# Patient Record
Sex: FEMALE | Race: WHITE | NOT HISPANIC OR LATINO | Employment: UNEMPLOYED | URBAN - METROPOLITAN AREA
[De-identification: names, ages, dates, MRNs, and addresses within clinical notes are randomized per-mention and may not be internally consistent; named-entity substitution may affect disease eponyms.]

---

## 2018-05-23 ENCOUNTER — APPOINTMENT (OUTPATIENT)
Dept: GENERAL RADIOLOGY | Facility: CLINIC | Age: 43
End: 2018-05-23
Attending: FAMILY MEDICINE

## 2018-05-23 ENCOUNTER — HOSPITAL ENCOUNTER (EMERGENCY)
Facility: CLINIC | Age: 43
Discharge: HOME | End: 2018-05-23
Attending: FAMILY MEDICINE | Admitting: FAMILY MEDICINE

## 2018-05-23 VITALS
RESPIRATION RATE: 16 BRPM | SYSTOLIC BLOOD PRESSURE: 137 MMHG | TEMPERATURE: 98.3 F | OXYGEN SATURATION: 100 % | DIASTOLIC BLOOD PRESSURE: 99 MMHG

## 2018-05-23 DIAGNOSIS — S89.92XA KNEE INJURY, LEFT, INITIAL ENCOUNTER: ICD-10-CM

## 2018-05-23 PROCEDURE — 99284 EMERGENCY DEPT VISIT MOD MDM: CPT | Performed by: FAMILY MEDICINE

## 2018-05-23 PROCEDURE — 99284 EMERGENCY DEPT VISIT MOD MDM: CPT | Mod: Z6 | Performed by: FAMILY MEDICINE

## 2018-05-23 PROCEDURE — 25000132 ZZH RX MED GY IP 250 OP 250 PS 637: Performed by: FAMILY MEDICINE

## 2018-05-23 PROCEDURE — 73560 X-RAY EXAM OF KNEE 1 OR 2: CPT | Mod: RT

## 2018-05-23 RX ORDER — HYDROCODONE BITARTRATE AND ACETAMINOPHEN 5; 325 MG/1; MG/1
1-2 TABLET ORAL EVERY 6 HOURS PRN
Qty: 12 TABLET | Refills: 0 | Status: SHIPPED | OUTPATIENT
Start: 2018-05-23

## 2018-05-23 RX ADMIN — IBUPROFEN 600 MG: 400 TABLET ORAL at 07:57

## 2018-05-23 NOTE — ED AVS SNAPSHOT
[facility] Emergency Department    [address] [address]    Phone:  [phone]    Fax:  [phone]                                       Debbie Hernandez   MRN: [id number]    Department:  [facility] Emergency Department   Date of Visit:  5/23/2018           Patient Information     Date Of Birth          1975        Your diagnoses for this visit were:     Knee injury, left, initial encounter        You were seen by Niall Conrad MD.      Follow-up Information     Follow up with [facility].    Contact information:    [address] [phone]        Discharge Instructions       Return to the Emergency Room if the following occurs:     Worsened pain, fever >101, or for any concern at anytime.    Or, follow-up with the following provider as we discussed:     Return to the orthopedic clinic here at [facility].  See contact information provided for details.  Call them today and tell them that you are seen in the ER and that you need close follow up in the clinic for a knee injury.    Medications discussed:    Ibuprofen 600 mg every six hours for pain (7 days duration).  Tylenol 1000 mg every six hours for pain (7 days duration).  Therefore, you can alternate these every three hours and do it safely.  Norco (5/325) 1-2 tablets every 8 hours for pain (start with one).  Note, each tab has 325 mg tylenol.  Do not exceed 3000 mg tylenol per 24 hours.  Crutches for pain, as needed.    If you received pain-relieving or sedating medication during your time in the ER, avoid alcohol, driving automobiles, or working with machinery.  Also, a responsible adult must stay with you.        Call the Nurse Advice Line at [phone] or [phone] for any concern at anytime.      24 Hour Appointment Hotline       To make an appointment at any [facility] clinic, call [phone] ([phone]). If you don't have a  family doctor or clinic, we will help you find one. [facility] clinics are conveniently located to serve the needs of you and your family.          ED Discharge Orders     Alum Crutches: Adult       Use gait belt during crutch training.                     Review of your medicines      START taking        Dose / Directions Last dose taken    HYDROcodone-acetaminophen 5-325 MG per tablet   Commonly known as:  NORCO   Dose:  1-2 tablet   Quantity:  12 tablet        Take 1-2 tablets by mouth every 6 hours as needed for pain maximum 10 tablet(s) per day   Refills:  0          Our records show that you are taking the medicines listed below. If these are incorrect, please call your family doctor or clinic.        Dose / Directions Last dose taken    ADVIL 200 MG capsule   Generic drug:  ibuprofen        1 CAPSULE EVERY 4 TO 6 HOURS AS NEEDED   Refills:  0        NAPROXEN PO   Dose:  1 tablet        Take 1 tablet by mouth 2 times daily (with meals).   Refills:  0        * oxyCODONE-acetaminophen 5-325 MG per tablet   Commonly known as:  PERCOCET   Dose:  1-2 tablet   Quantity:  20 tablet        Take 1-2 tablets by mouth every 6 hours as needed for pain.   Refills:  0        * oxyCODONE-acetaminophen 5-325 MG per tablet   Commonly known as:  PERCOCET   Dose:  1 tablet   Quantity:  20 tablet        Take 1 tablet by mouth every 6 hours as needed for pain.   Refills:  0        XANAX PO   Dose:  0.25 mg        Take 0.25 mg by mouth 3 times daily as needed.   Refills:  0        * Notice:  This list has 2 medication(s) that are the same as other medications prescribed for you. Read the directions carefully, and ask your doctor or other care provider to review them with you.            Information about OPIOIDS     PRESCRIPTION OPIOIDS: WHAT YOU NEED TO KNOW   You have a prescription for an opioid (narcotic) pain medicine. Opioids can cause addiction. If you have a history of chemical dependency of any type, you are at a higher risk  of becoming addicted to opioids. Only take this medicine after all other options have been tried. Take it for as short a time and as few doses as possible.     Do not:    Drive. If you drive while taking these medicines, you could be arrested for driving under the influence (DUI).    Operate heavy machinery    Do any other dangerous activities while taking these medicines.     Drink any alcohol while taking these medicines.      Take with any other medicines that contain acetaminophen. Read all labels carefully. Look for the word  acetaminophen  or  Tylenol.  Ask your pharmacist if you have questions or are unsure.    Store your pills in a secure place, locked if possible. We will not replace any lost or stolen medicine. If you don t finish your medicine, please throw away (dispose) as directed by your pharmacist. The [place] [employer]ution [employer] has more information about safe disposal: https://www.pca.[facility].[place].us/living-green/managing-unwanted-medications    All opioids tend to cause constipation. Drink plenty of water and eat foods that have a lot of fiber, such as fruits, vegetables, prune juice, apple juice and high-fiber cereal. Take a laxative (Miralax, milk of magnesia, Colace, Senna) if you don t move your bowels at least every other day.         Prescriptions were sent or printed at these locations (1 Prescription)                   Other Prescriptions                Printed at Department/Unit printer (1 of 1)         HYDROcodone-acetaminophen (NORCO) 5-325 MG per tablet                Procedures and tests performed during your visit     XR Knee Right 1/2 Views      Orders Needing Specimen Collection     None      Pending Results     No orders found from 5/21/2018 to 5/24/2018.            Pending Culture Results     No orders found from 5/21/2018 to 5/24/2018.            Pending Results Instructions     If you had any lab results that were not finalized at the time of your Discharge, you can call the  "ED Lab Result RN at [phone]. You will be contacted by this team for any positive Lab results or changes in treatment. The nurses are available 7 days a week from 10A to 6:30P.  You can leave a message 24 hours per day and they will return your call.        Test Results From Your Hospital Stay        2018  8:28 AM      Narrative     XR KNEE RT 1 /2 VW 2018 8:15 AM    HISTORY: Pain.    COMPARISON: None.        Impression     IMPRESSION: No evidence of acute fracture or malalignment. Mild  degenerative change with small lateral knee osteophytes.    KIMBERLY BARNES MD                Thank you for choosing [facility]       Thank you for choosing [facility] for your care. Our goal is always to provide you with excellent care. Hearing back from our patients is one way we can continue to improve our services. Please take a few minutes to complete the written survey that you may receive in the mail after you visit with us. Thank you!        "[employer]hart Information     [employer] lets you send messages to your doctor, view your test results, renew your prescriptions, schedule appointments and more. To sign up, go to [email or web address] . Click on \"Log in\" on the left side of the screen, which will take you to the Welcome page. Then click on \"Sign up Now\" on the right side of the page.     You will be asked to enter the access code listed below, as well as some personal information. Please follow the directions to create your username and password.     Your access code is: [email or web address]  Expires: 2018  8:56 AM     Your access code will  in 90 days. If you need help or a new code, please call your [facility] clinic or [phone].        Care EveryWhere ID     This is your Care EveryWhere ID. This could be used by other organizations to access your [facility] medical records  [id number]        Equal Access to Services     AIDA SINGH AH: Merari Stone, odilia longoria, emanuel burton, " gabe carlos'aan ah. So [facility] [phone].    ATENCIÓN: Si habla español, tiene a patton disposición servicios gratuitos de asistencia lingüística. Llame al [phone].    We comply with applicable federal civil rights laws and [place] laws. We do not discriminate on the basis of race, color, national origin, age, disability, sex, sexual orientation, or gender identity.            After Visit Summary       This is your record. Keep this with you and show to your community pharmacist(s) and doctor(s) at your next visit.

## 2018-05-23 NOTE — ED TRIAGE NOTES
Pt twisted L knee today, tripped on a stump and fell.  Denies injury elsewhere.  States he L knee shifted in, having lots of pain in knee.  Unable to straighten L leg.  Feeling tingling in foot with injury, denies any numbness or tingling now.  Constant pain in knee.  Unable to bear weight.  Pain behind knee and to outer side of knee.

## 2018-05-23 NOTE — ED AVS SNAPSHOT
[facility] Emergency Department    [address] [address]    Phone:  [phone]    Fax:  [phone]                                       Debbie Hernandez   MRN: [id number]    Department:  [facility] Emergency Department   Date of Visit:  5/23/2018           After Visit Summary Signature Page     I have received my discharge instructions, and my questions have been answered. I have discussed any challenges I see with this plan with the nurse or doctor.    ..........................................................................................................................................  Patient/Patient Representative Signature      ..........................................................................................................................................  Patient Representative Print Name and Relationship to Patient    ..................................................               ................................................  Date                                            Time    ..........................................................................................................................................  Reviewed by Signature/Title    ...................................................              ..............................................  Date                                                            Time

## 2018-05-23 NOTE — ED PROVIDER NOTES
HPI  Patient is a 42-year-old female presenting with a left knee injury.  She has a known history of meniscus injury and subsequent surgery involving both the left and right knees.  She had a patella fracture and subsequent chondromalacia on the right side as well.  Other past medical history reviewed.  Occasions reviewed.  She smokes.  Occasional alcohol, none recently.  No drugs of abuse.    The patient was walking outside this morning when she merely tripped over a stump.  As she recognized the stump she tried to hop or jump into a better position.  She came down onto the left foot as she did so.  She felt the knee twist and buckle.  She did not fall to the ground.  She immediately had sharp pain felt in the lateral and posterior knee.  This continued to worsen in severity and she has not been able to bear any weight fully on the left foot since the injury.  Her pain is located in the knee only.  No other injury reported.  She has difficulty extending and flexing the knee because of severe pain.    ROS: All other review of systems are negative other than that noted above.      Past Medical History:   Diagnosis Date     Cervical ca (H)      Knee pain      Past Surgical History:   Procedure Laterality Date     ARTHROSCOPY KNEE Right      D & C  6/2001     SURGICAL HISTORY OF -       [facility]     Family History   Problem Relation Age of Onset     DIABETES Mother      Hypertension Mother      DIABETES Father      Hypertension Father      HEART DISEASE Father      heart disease     Social History   Substance Use Topics     Smoking status: Current Every Day Smoker     Packs/day: 1.00     Years: 10.00     Types: Cigarettes     Smokeless tobacco: Never Used     Alcohol use Yes      Comment: rarely         PHYSICAL  BP (!) 137/99  Temp 98.3  F (36.8  C) (Oral)  Resp 16  SpO2 100%  General: Patient is alert and in moderate to severe distress.  Neurological: Alert.  Moving upper and lower extremities equally, bilaterally.   Head / Neck: Atraumatic.  Ears: Not done.  Eyes: Pupils are equal, round, and reactive.  Normal conjunctiva.  Nose: Midline.  No epistaxis.  Mouth / Throat:  Moist. Respiratory: No respiratory distress.  Cardiovascular: Regular rhythm.  Peripheral extremities are warm.  Abdomen / Pelvis: Not done.  Genitalia: Not done.  Musculoskeletal: The patient has tenderness along the posterior and lateral left knee.  I do not see an obvious effusion.  She is holding the knee in slight flexion, on a pillow.  She refuses to flex or extend the knee any further.  No hematoma appreciated.  No deformity appreciated.  She is not obviously tender over any bony prominences.  Skin: No evidence of rash or trauma.        PHYSICIAN  0754.  The patient has an injury to her left knee.  X-ray pending.  Ibuprofen 600 mg given.    IMAGING  Images reviewed by me.  Radiology report also reviewed.  XR Knee Right 1/2 Views   Final Result   IMPRESSION: No evidence of acute fracture or malalignment. Mild   degenerative change with small lateral knee osteophytes.      KIMBERLY BARNES MD        0858.  X-ray is unremarkable.  No evidence for acute fracture.  Severity of injury is not known at this time.  Crutches will be provided.  The patient refuses a knee immobilizer.  A prescription for hydrocodone/acetaminophen given, #12 tablets.  Follow up with orthopedics.  Contact information provided.      IMPRESSION    ICD-10-CM    1. Knee injury, left, initial encounter S89.92XA Alum Crutches: Adult            Niall Conrad MD  05/23/18 9353

## 2019-02-19 ENCOUNTER — HOSPITAL ENCOUNTER (EMERGENCY)
Facility: CLINIC | Age: 44
Discharge: HOME | End: 2019-02-19
Attending: EMERGENCY MEDICINE | Admitting: EMERGENCY MEDICINE
Payer: COMMERCIAL

## 2019-02-19 VITALS
HEART RATE: 93 BPM | BODY MASS INDEX: 22.18 KG/M2 | TEMPERATURE: 97.7 F | RESPIRATION RATE: 16 BRPM | HEIGHT: 66 IN | OXYGEN SATURATION: 100 % | WEIGHT: 138 LBS

## 2019-02-19 DIAGNOSIS — N30.01 ACUTE CYSTITIS WITH HEMATURIA: ICD-10-CM

## 2019-02-19 LAB
ALBUMIN UR-MCNC: 100 MG/DL
APPEARANCE UR: ABNORMAL
BILIRUB UR QL STRIP: NEGATIVE
COLOR UR AUTO: ABNORMAL
GLUCOSE UR STRIP-MCNC: NEGATIVE MG/DL
HCG UR QL: NEGATIVE
HGB UR QL STRIP: ABNORMAL
KETONES UR STRIP-MCNC: 5 MG/DL
LEUKOCYTE ESTERASE UR QL STRIP: ABNORMAL
MUCOUS THREADS #/AREA URNS LPF: PRESENT /LPF
NITRATE UR QL: NEGATIVE
PH UR STRIP: 5 PH (ref 5–7)
RBC #/AREA URNS AUTO: >182 /HPF (ref 0–2)
SOURCE: ABNORMAL
SP GR UR STRIP: 1.03 (ref 1–1.03)
UROBILINOGEN UR STRIP-MCNC: 2 MG/DL (ref 0–2)
WBC #/AREA URNS AUTO: >182 /HPF (ref 0–5)
WBC CLUMPS #/AREA URNS HPF: PRESENT /HPF

## 2019-02-19 PROCEDURE — 87086 URINE CULTURE/COLONY COUNT: CPT | Performed by: EMERGENCY MEDICINE

## 2019-02-19 PROCEDURE — 99283 EMERGENCY DEPT VISIT LOW MDM: CPT | Performed by: EMERGENCY MEDICINE

## 2019-02-19 PROCEDURE — 99284 EMERGENCY DEPT VISIT MOD MDM: CPT | Mod: Z6 | Performed by: EMERGENCY MEDICINE

## 2019-02-19 PROCEDURE — 81025 URINE PREGNANCY TEST: CPT | Performed by: EMERGENCY MEDICINE

## 2019-02-19 PROCEDURE — 81001 URINALYSIS AUTO W/SCOPE: CPT | Performed by: EMERGENCY MEDICINE

## 2019-02-19 RX ORDER — CIPROFLOXACIN 500 MG/1
500 TABLET, FILM COATED ORAL 2 TIMES DAILY
Qty: 14 TABLET | Refills: 0 | Status: SHIPPED | OUTPATIENT
Start: 2019-02-19 | End: 2019-02-26

## 2019-02-19 RX ORDER — FLUCONAZOLE 150 MG/1
TABLET ORAL
Qty: 2 TABLET | Refills: 0 | Status: SHIPPED | OUTPATIENT
Start: 2019-02-19 | End: 2019-02-22

## 2019-02-19 RX ORDER — PHENAZOPYRIDINE HYDROCHLORIDE 200 MG/1
200 TABLET, FILM COATED ORAL 3 TIMES DAILY
Qty: 9 TABLET | Refills: 0 | Status: SHIPPED | OUTPATIENT
Start: 2019-02-19 | End: 2019-02-22

## 2019-02-19 ASSESSMENT — MIFFLIN-ST. JEOR: SCORE: 1292.71

## 2019-02-19 NOTE — ED PROVIDER NOTES
History     Chief Complaint   Patient presents with     Rule out Urinary Tract Infection     4 days now, burning and pain with urination     HPI  Debbie Hernandez is a 43 year old female with a history of previous urinary tract infections who presents emergency department complaining of frequency and burning.  Patient states she gets about 2-3 urinary tract infections a year and states she is having similar symptoms today.  Began as some mild pain after urination but now has become frequency and worsening burning.  She states this morning when she urinated her urine was noted to have  some blood in it.  Patient states she has some very mild aching in her flank regions but denies significant pain.  She denies any chest pain or shortness of breath.  She has not had any fever.  She denies any focal numbness weakness any extremity.  Patient has not had any nausea vomiting.  Currently rates her pain a 3 out of 10.    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     Priority: Medium        Past Medical History:    Past Medical History:   Diagnosis Date     Cervical ca (H)      Knee pain        Past Surgical History:    Past Surgical History:   Procedure Laterality Date     ARTHROSCOPY KNEE Right      D & C  6/2001     SURGICAL HISTORY OF -       [place]       Family History:    Family History   Problem Relation Age of Onset     Diabetes Mother      Hypertension Mother      Diabetes Father      Hypertension Father      Heart Disease Father         heart disease       Social History:  Marital Status:  Single [1]  Social History     Tobacco Use     Smoking status: Current Every Day Smoker     Packs/day: 1.00     Years: 10.00     Pack years: 10.00     Types: Cigarettes     Smokeless tobacco: Never Used   Substance Use Topics     Alcohol use: Yes     Comment: rarely     Drug use:  "No        Medications:      ADVIL 200 MG OR CAPS   ALPRAZolam (XANAX PO)   HYDROcodone-acetaminophen (NORCO) 5-325 MG per tablet   NAPROXEN PO   oxyCODONE-acetaminophen (PERCOCET) 5-325 MG per tablet   oxyCODONE-acetaminophen (PERCOCET) 5-325 MG per tablet         Review of Systems  All systems reviewed and other than pertinent positives and negatives in HPI all other systems are negative.  Physical Exam   Pulse: 93  Temp: 97.7  F (36.5  C)  Resp: 16  Height: 167.6 cm (5' 6\")  Weight: 62.6 kg (138 lb)  SpO2: 100 %      Physical Exam   Constitutional: She appears well-developed and well-nourished. No distress.   HENT:   Head: Normocephalic.   Eyes: Conjunctivae are normal.   Pulmonary/Chest: Effort normal.   Abdominal: Soft. She exhibits no distension.   Mild suprapubic tenderness.   Musculoskeletal: Normal range of motion.   No flank pain present.   Neurological: She is alert. She exhibits normal muscle tone.   Skin: Skin is warm and dry. No rash noted.   Psychiatric: She has a normal mood and affect.   Nursing note and vitals reviewed.      ED Course        Procedures               Critical Care time:  none                 Labs Ordered and Resulted from Time of ED Arrival Up to the Time of Departure from the ED   ROUTINE UA WITH MICROSCOPIC - Abnormal; Notable for the following components:       Result Value    Ketones Urine 5 (*)     Blood Urine Large (*)     Protein Albumin Urine 100 (*)     Leukocyte Esterase Urine Trace (*)     WBC Urine >182 (*)     RBC Urine >182 (*)     WBC Clumps Present (*)     Mucous Urine Present (*)     All other components within normal limits   HCG QUALITATIVE URINE     Medications - No data to display    Assessments & Plan (with Medical Decision Making) records were reviewed.  Urinalysis was obtained.  Urine analysis was consistent with a UTI.  There is large RBCs and blood.  I discussed possible CT scan to rule out stone but patient feels this is her typical UTI.  She does not feel " further workup is warranted.  She would like to try antibiotics and if symptoms worsen she will return for imaging studies and further workup.  Patient will be covered with Cipro.  She will also be given Pyridium.  She understands to return if fevers, flank pain, decreased urine output increased abdominal pain or other symptoms present.  Patient has a history of getting candidal infections with antibiotics and therefore I did send her home with a prescription for Diflucan as needed.     I have reviewed the nursing notes.    I have reviewed the findings, diagnosis, plan and need for follow up with the patient.          Medication List      Started    ciprofloxacin 500 MG tablet  Commonly known as:  CIPRO  500 mg, Oral, 2 TIMES DAILY     fluconazole 150 MG tablet  Commonly known as:  DIFLUCAN  Take one tablet now, and one tablet in three days     phenazopyridine 200 MG tablet  Commonly known as:  PYRIDIUM  200 mg, Oral, 3 TIMES DAILY            Final diagnoses:   Acute cystitis with hematuria       2/19/2019   [facility] EMERGENCY DEPARTMENT     Ludin Lerma MD  02/20/19 1936

## 2019-02-19 NOTE — ED AVS SNAPSHOT
[facility] Emergency Department  [address] [address]  Phone:  [phone]  Fax:  [phone]                                    Debbie Hernandez   MRN: [id number]    Department:  [facility] Emergency Department   Date of Visit:  2/19/2019           After Visit Summary Signature Page    I have received my discharge instructions, and my questions have been answered. I have discussed any challenges I see with this plan with the nurse or doctor.    ..........................................................................................................................................  Patient/Patient Representative Signature      ..........................................................................................................................................  Patient Representative Print Name and Relationship to Patient    ..................................................               ................................................  Date                                   Time    ..........................................................................................................................................  Reviewed by Signature/Title    ...................................................              ..............................................  Date                                               Time          22EPIC Rev 08/18

## 2019-02-19 NOTE — DISCHARGE INSTRUCTIONS
Return if symptoms worsen or new symptoms develop.  Follow-up with primary care physician later this week for recheck take antibiotics as directed take Pyridium as directed this may turn your urine orange.  If blood in your urine does not improve or other symptoms present please return for further evaluation and care.  Drink plenty of fluids.  We discussed possible CT scan for stone but at this time we will treat for urinary tract infection if persistent flank pain please return for recheck.

## 2019-02-20 LAB
BACTERIA SPEC CULT: NORMAL
SPECIMEN SOURCE: NORMAL

## 2019-12-29 ENCOUNTER — HOSPITAL ENCOUNTER (EMERGENCY)
Facility: CLINIC | Age: 44
Discharge: LEFT BEFORE DISCHARGE | End: 2019-12-29
Attending: EMERGENCY MEDICINE
Payer: COMMERCIAL

## 2019-12-29 VITALS
WEIGHT: 129 LBS | BODY MASS INDEX: 20.73 KG/M2 | DIASTOLIC BLOOD PRESSURE: 82 MMHG | TEMPERATURE: 98.5 F | SYSTOLIC BLOOD PRESSURE: 117 MMHG | RESPIRATION RATE: 18 BRPM | HEIGHT: 66 IN | OXYGEN SATURATION: 100 %

## 2019-12-29 ASSESSMENT — MIFFLIN-ST. JEOR: SCORE: 1246.89

## 2022-12-04 ENCOUNTER — HOSPITAL ENCOUNTER (EMERGENCY)
Facility: CLINIC | Age: 47
Discharge: HOME | End: 2022-12-04
Attending: EMERGENCY MEDICINE | Admitting: EMERGENCY MEDICINE
Payer: COMMERCIAL

## 2022-12-04 VITALS
BODY MASS INDEX: 19.29 KG/M2 | WEIGHT: 120 LBS | DIASTOLIC BLOOD PRESSURE: 76 MMHG | SYSTOLIC BLOOD PRESSURE: 124 MMHG | RESPIRATION RATE: 18 BRPM | HEIGHT: 66 IN | OXYGEN SATURATION: 99 % | TEMPERATURE: 97.8 F | HEART RATE: 57 BPM

## 2022-12-04 DIAGNOSIS — N93.9 VAGINAL BLEEDING: ICD-10-CM

## 2022-12-04 LAB
ALBUMIN UR-MCNC: NEGATIVE MG/DL
ANION GAP SERPL CALCULATED.3IONS-SCNC: 6 MMOL/L (ref 7–15)
APPEARANCE UR: CLEAR
BACTERIA #/AREA URNS HPF: ABNORMAL /HPF
BASOPHILS # BLD AUTO: 0.1 10E3/UL (ref 0–0.2)
BASOPHILS NFR BLD AUTO: 1 %
BILIRUB UR QL STRIP: NEGATIVE
BUN SERPL-MCNC: 14.5 MG/DL (ref 6–20)
CALCIUM SERPL-MCNC: 9.2 MG/DL (ref 8.6–10)
CHLORIDE SERPL-SCNC: 100 MMOL/L (ref 98–107)
COLOR UR AUTO: YELLOW
CREAT SERPL-MCNC: 0.94 MG/DL (ref 0.51–0.95)
DEPRECATED HCO3 PLAS-SCNC: 32 MMOL/L (ref 22–29)
EOSINOPHIL # BLD AUTO: 0.2 10E3/UL (ref 0–0.7)
EOSINOPHIL NFR BLD AUTO: 2 %
ERYTHROCYTE [DISTWIDTH] IN BLOOD BY AUTOMATED COUNT: 13.2 % (ref 10–15)
GFR SERPL CREATININE-BSD FRML MDRD: 74 ML/MIN/1.73M2
GLUCOSE SERPL-MCNC: 83 MG/DL (ref 70–99)
GLUCOSE UR STRIP-MCNC: NEGATIVE MG/DL
HCG UR QL: NEGATIVE
HCT VFR BLD AUTO: 41.8 % (ref 35–47)
HGB BLD-MCNC: 13.2 G/DL (ref 11.7–15.7)
HGB UR QL STRIP: ABNORMAL
HOLD SPECIMEN: NORMAL
IMM GRANULOCYTES # BLD: 0 10E3/UL
IMM GRANULOCYTES NFR BLD: 0 %
KETONES UR STRIP-MCNC: NEGATIVE MG/DL
LEUKOCYTE ESTERASE UR QL STRIP: NEGATIVE
LYMPHOCYTES # BLD AUTO: 2 10E3/UL (ref 0.8–5.3)
LYMPHOCYTES NFR BLD AUTO: 18 %
MCH RBC QN AUTO: 32.7 PG (ref 26.5–33)
MCHC RBC AUTO-ENTMCNC: 31.6 G/DL (ref 31.5–36.5)
MCV RBC AUTO: 104 FL (ref 78–100)
MONOCYTES # BLD AUTO: 0.8 10E3/UL (ref 0–1.3)
MONOCYTES NFR BLD AUTO: 7 %
MUCOUS THREADS #/AREA URNS LPF: PRESENT /LPF
NEUTROPHILS # BLD AUTO: 8.1 10E3/UL (ref 1.6–8.3)
NEUTROPHILS NFR BLD AUTO: 72 %
NITRATE UR QL: POSITIVE
NRBC # BLD AUTO: 0 10E3/UL
NRBC BLD AUTO-RTO: 0 /100
PH UR STRIP: 7 [PH] (ref 5–7)
PLATELET # BLD AUTO: 313 10E3/UL (ref 150–450)
POTASSIUM SERPL-SCNC: 4.3 MMOL/L (ref 3.4–5.3)
RBC # BLD AUTO: 4.04 10E6/UL (ref 3.8–5.2)
RBC URINE: 1 /HPF
SODIUM SERPL-SCNC: 138 MMOL/L (ref 136–145)
SP GR UR STRIP: 1.02 (ref 1–1.03)
SQUAMOUS EPITHELIAL: <1 /HPF
T4 FREE SERPL-MCNC: 0.41 NG/DL (ref 0.9–1.7)
TSH SERPL DL<=0.005 MIU/L-ACNC: 66.46 UIU/ML (ref 0.3–4.2)
UROBILINOGEN UR STRIP-MCNC: NORMAL MG/DL
WBC # BLD AUTO: 11.3 10E3/UL (ref 4–11)
WBC URINE: 6 /HPF

## 2022-12-04 PROCEDURE — 87491 CHLMYD TRACH DNA AMP PROBE: CPT | Performed by: EMERGENCY MEDICINE

## 2022-12-04 PROCEDURE — 84439 ASSAY OF FREE THYROXINE: CPT | Performed by: EMERGENCY MEDICINE

## 2022-12-04 PROCEDURE — 81025 URINE PREGNANCY TEST: CPT | Performed by: EMERGENCY MEDICINE

## 2022-12-04 PROCEDURE — 87591 N.GONORRHOEAE DNA AMP PROB: CPT | Performed by: EMERGENCY MEDICINE

## 2022-12-04 PROCEDURE — 99284 EMERGENCY DEPT VISIT MOD MDM: CPT

## 2022-12-04 PROCEDURE — 85025 COMPLETE CBC W/AUTO DIFF WBC: CPT | Performed by: EMERGENCY MEDICINE

## 2022-12-04 PROCEDURE — 84443 ASSAY THYROID STIM HORMONE: CPT | Performed by: EMERGENCY MEDICINE

## 2022-12-04 PROCEDURE — 81001 URINALYSIS AUTO W/SCOPE: CPT | Performed by: EMERGENCY MEDICINE

## 2022-12-04 PROCEDURE — 80048 BASIC METABOLIC PNL TOTAL CA: CPT | Performed by: EMERGENCY MEDICINE

## 2022-12-04 PROCEDURE — 87086 URINE CULTURE/COLONY COUNT: CPT | Performed by: EMERGENCY MEDICINE

## 2022-12-04 PROCEDURE — 99282 EMERGENCY DEPT VISIT SF MDM: CPT | Performed by: EMERGENCY MEDICINE

## 2022-12-04 PROCEDURE — 36415 COLL VENOUS BLD VENIPUNCTURE: CPT | Performed by: EMERGENCY MEDICINE

## 2022-12-04 RX ORDER — IBUPROFEN 400 MG/1
800 TABLET, FILM COATED ORAL ONCE
Status: DISCONTINUED | OUTPATIENT
Start: 2022-12-04 | End: 2022-12-04

## 2022-12-04 RX ORDER — ACETAMINOPHEN 500 MG
1000 TABLET ORAL ONCE
Status: DISCONTINUED | OUTPATIENT
Start: 2022-12-04 | End: 2022-12-04

## 2022-12-04 RX ORDER — LEVOTHYROXINE SODIUM 112 UG/1
112 TABLET ORAL ONCE
Status: DISCONTINUED | OUTPATIENT
Start: 2022-12-04 | End: 2022-12-04

## 2022-12-04 ASSESSMENT — ACTIVITIES OF DAILY LIVING (ADL)
ADLS_ACUITY_SCORE: 35
ADLS_ACUITY_SCORE: 35

## 2022-12-04 NOTE — ED TRIAGE NOTES
Patient presents with low back pain starting yesterday and now radiating to the low abd/pelvis. Then had a gush of vaginal bleeding today      Triage Assessment     Row Name 12/04/22 1543       Triage Assessment (Adult)    Airway WDL WDL       Respiratory WDL    Respiratory WDL WDL       Skin Circulation/Temperature WDL    Skin Circulation/Temperature WDL WDL       Cardiac WDL    Cardiac WDL WDL

## 2022-12-05 ENCOUNTER — TELEPHONE (OUTPATIENT)
Dept: EMERGENCY MEDICINE | Facility: CLINIC | Age: 47
End: 2022-12-05

## 2022-12-05 LAB
C TRACH DNA SPEC QL NAA+PROBE: NEGATIVE
N GONORRHOEA DNA SPEC QL NAA+PROBE: NEGATIVE

## 2022-12-05 NOTE — TELEPHONE ENCOUNTER
[facility] Emergency Department Lab result notification    [place] ED lab result protocol used  Misc    Reason for call  Notify of lab results, assess symptoms,  review ED providers recommendations/discharge instructions (if necessary) and advise per ED lab result f/u protocol    Lab Result (including Rx patient on, if applicable)  Abnormal Result.  Final Free T4 level is 0.41 and this level is [elevated].  Normal reference range is 0.90-1.70 ng/dL  Resulted after [facility]) Emergency Dept visit on this date 12/4/22.  [facility] patients, route result to PCP.   Non [facility] patients, RN to notify patient/parent of result and advise to relay result to their PCP immediately.  [ED lab result f/u RN may want to fax result to PCP].    Abnormal Result.  Final TSH level is 66.46 and this level is [elevated].  Normal reference range is 0.30-4.20 uIU/mL  Resulted after [facility]) Emergency Dept visit on this date 12/4/22.  [facility] patients, route result to PCP.   Non [facility] patients, RN to notify patient/parent of result and advise to relay result to their PCP immediately.  [ED lab result f/u RN may want to fax result to PCP].    RN Assessment (Patient s current Symptoms), include time called.   5:14 PM Left voicemail message requesting a call back to [facility] ED Lab Result RN at [phone].  RN is available every day between 9 a.m. and 5:30 p.m.    Laura Brewer RN  [facility] [place]  Emergency Dept Lab Result RN  Ph# [phone]     Copy of Lab result   Component      Latest Ref Rng & Units 12/4/2022   TSH      0.30 - 4.20 uIU/mL 66.46 (H)   T4 Free      0.90 - 1.70 ng/dL 0.41 (L)

## 2022-12-05 NOTE — RESULT ENCOUNTER NOTE
[facility] Emergency Dept discharge antibiotic (if prescribed): None   No changes in treatment per [facility] ED Lab Result Urine culture protocol.

## 2022-12-05 NOTE — DISCHARGE INSTRUCTIONS
Follow-up GYN for further evaluation and ultrasound    Recommend ibuprofen 800 mg every 6-8 hours as needed for pain, acetaminophen 1000 mg every 6 hours as needed for pain    Return here for bleeding greater than 1 heavy pad per hour, fever or other concern.    Follow-up primary care for medication update

## 2022-12-05 NOTE — LETTER
December 7, 2022        Debbie Hernandez  [address]   [place] [address]          Dear Debbie Hernandez:    You were seen in the [facility] Emergency Department at [facility] EMERGENCY DEPT on 12/4/2022.  We are unable to reach you by phone, so we are sending you this letter.     It is important that you call [facility] Emergency Department lab result nurse at [phone], as we have information to relay to you AND/OR we MAY have to make some changes in your treatment.    Best time to call back is between 9AM and 5:30PM, 7 days a week.      Sincerely,     [facility] Emergency Department Lab Result RN  [phone]

## 2022-12-06 NOTE — TELEPHONE ENCOUNTER
[facility] Emergency Department Lab result notification    [place] ED lab result protocol used  Urine culture  Misc lab    Reason for call  Notify of lab results, assess symptoms,  review ED providers recommendations/discharge instructions (if necessary) and advise per ED lab result f/u protocol    Lab Result (including Rx patient on, if applicable)  Preliminary urine culture report on 12/6/22 shows the presence of bacteria(s):  >100,000 CFU/mL Escherichia coli  Emergency Dept/Urgent Care discharge antibiotic: None  Recommendations per [facility] ED Lab result Urine culture protocol.    Abnormal Result.  Final Free T4 level is 0.41 and this level is [elevated].  Normal reference range is 0.90-1.70 ng/dL  Resulted after [facility]) Emergency Dept visit on this date 12/4/22.  [facility] patients, route result to PCP.   Non [facility] patients, RN to notify patient/parent of result and advise to relay result to their PCP immediately.  [ED lab result f/u RN may want to fax result to PCP].     Abnormal Result.  Final TSH level is 66.46 and this level is [elevated].  Normal reference range is 0.30-4.20 uIU/mL  Resulted after [facility] ([place]) Emergency Dept visit on this date 12/4/22.  [facility] patients, route result to PCP.   Non [facility] patients, RN to notify patient/parent of result and advise to relay result to their PCP immediately.  [ED lab result f/u RN may want to fax result to PCP].    Information table from Emergency Dept Provider visit on 12/4/22  Symptoms reported at ED visit (Chief complaint, HPI) ED note incomplete   Significant Medical hx, if applicable (i.e. CKD, diabetes) Reviewed   Allergies Allergies   Allergen Reactions     Nkda [No Known Drug Allergies]       Weight, if applicable Wt Readings from Last 2 Encounters:   12/04/22 54.4 kg (120 lb)   12/29/19 58.5 kg (129 lb)      Coumadin/Warfarin [Yes /No] No    Creatinine Level (mg/dl) Creatinine   Date Value Ref Range Status   12/04/2022 0.94 0.51 - 0.95 mg/dL Final   06/20/2013 0.86 0.52 - 1.04 mg/dL Final      Creatinine clearance (ml/min), if applicable Serum creatinine: 0.94 mg/dL 12/04/22 1549  Estimated creatinine clearance: 62.9 mL/min   Pregnant (Yes/No/NA) No   Breastfeeding (Yes/No/NA) ?   ED providers Impression and Plan (applicable information) Note incomplete   ED diagnosis  Vaginal bleeding   ED provider David Henderson MD        RN Assessment (Patient s current Symptoms), include time called.  [Insert Left message here if message left]  2:52PM: Left voicemail message requesting a call back to [facility] ED Lab Result RN at [phone].  RN is available every day between 9 a.m. and 5:30 p.m.    Kelley Sena RN  [facility]  Emergency Dept Lab Result RN  Ph# [phone]     Copy of Lab result   Urine Culture  Order: [id number]   Collected 12/4/2022  6:06 PM      Status: Preliminary result      Visible to patient: No (not released)     Specimen Information: Urine, Clean Catch    2 Result Notes  Culture >100,000 CFU/mL Escherichia coli Abnormal             Resulting Agency: [facility]           Specimen Collected: 12/04/22  6:06 PM Last Resulted: 12/06/22  9:46 AM                 Component      Latest Ref Rng & Units 12/4/2022   TSH      0.30 - 4.20 uIU/mL 66.46 (H)   T4 Free      0.90 - 1.70 ng/dL 0.41 (L)

## 2022-12-07 LAB — BACTERIA UR CULT: ABNORMAL

## 2022-12-07 RX ORDER — NITROFURANTOIN 25; 75 MG/1; MG/1
100 CAPSULE ORAL 2 TIMES DAILY
Qty: 10 CAPSULE | Refills: 0 | Status: CANCELLED | OUTPATIENT
Start: 2022-12-07 | End: 2022-12-12

## 2022-12-07 NOTE — RESULT ENCOUNTER NOTE
Third day trying to reach without success.  Will mail out letter requesting a call back to [facility] ED Lab Result RN at [phone].  RN is available every day between 9 a.m. and 5:30 p.m.

## 2022-12-07 NOTE — TELEPHONE ENCOUNTER
[facility] Emergency Department/Urgent Care Lab result notification     Patient/parent Name  Debbie    Lab result (if applicable):  Final urine culture on 12/7/22 shows the presence of bacteria(s): >100,000 CFU/ML Escherichia coli  [facility] Emergency Dept discharge antibiotic: None  Recommendations in treatment per [facility] ED lab result Urine Culture protocol.    Abnormal thyroid levels.  Encourage to discuss with PCP if and when Patient calls back.  Component      Latest Ref Rng & Units 12/4/2022   TSH      0.30 - 4.20 uIU/mL 66.46 (H)   T4 Free      0.90 - 1.70 ng/dL 0.41 (L)       RN Recommendations/Instructions per [place] ED lab result protocol  3rd day trying to reach.  Will mail out letter requesting a call back to [facility] ED Lab Result RN at [phone].  RN is available every day between 9 a.m. and 5:30 p.m.    Jose Alejandro Cornelius RN  [facility]  Emergency Dept Lab Result RN  Ph# [phone]     Copy of Lab result

## 2022-12-07 NOTE — RESULT ENCOUNTER NOTE
Final urine culture on 12/7/22 shows the presence of bacteria(s): >100,000 CFU/ML Escherichia coli  [facility] Emergency Dept discharge antibiotic: None  Recommendations in treatment per [facility] ED lab result Urine Culture protocol.

## 2022-12-12 NOTE — ED PROVIDER NOTES
"  History     Chief Complaint   Patient presents with     Pelvic Pain     Patient presents with low back pain starting yesterday and now radiating to the low abd/pelvis. Then had a gush of vaginal bleeding today      HPI  Debbie Hernandez is a 47 year old female who presents from [facility], vaginal bleeding today and low back pain.    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     Priority: Medium        Past Medical History:    Past Medical History:   Diagnosis Date     Cervical ca (H)      Knee pain        Past Surgical History:    Past Surgical History:   Procedure Laterality Date     ARTHROSCOPY KNEE Right      D & C  6/2001     SURGICAL HISTORY OF -       leep       Family History:    Family History   Problem Relation Age of Onset     Diabetes Mother      Hypertension Mother      Diabetes Father      Hypertension Father      Heart Disease Father         heart disease       Social History:  Marital Status:  Single [1]  Social History     Tobacco Use     Smoking status: Every Day     Packs/day: 1.00     Years: 10.00     Pack years: 10.00     Types: Cigarettes     Smokeless tobacco: Never   Substance Use Topics     Alcohol use: Yes     Comment: rarely     Drug use: No        Medications:    ADVIL 200 MG OR CAPS  ALPRAZolam (XANAX PO)  HYDROcodone-acetaminophen (NORCO) 5-325 MG per tablet  NAPROXEN PO  oxyCODONE-acetaminophen (PERCOCET) 5-325 MG per tablet  oxyCODONE-acetaminophen (PERCOCET) 5-325 MG per tablet          Review of Systems  Problem focused review of systems otherwise negative    Physical Exam   BP: 124/76  Pulse: 57  Temp: 97.8  F (36.6  C)  Resp: 18  Height: 167.6 cm (5' 6\")  Weight: 54.4 kg (120 lb)  SpO2: 99 %      Physical Exam  Nontoxic-appearing no respiratory distress alert and oriented  Skin pink warm and dry  Abdomen soft nontender  Vaginal exam " with female RN staff present shows normal external genitalia, vaginal mucosa is unremarkable, cervix with prior LEEP procedure, no active bleeding, scant brown blood in the vagina  ED Course                 Procedures              Critical Care time:  none               No results found for this or any previous visit (from the past 24 hour(s)).    Medications - No data to display    Assessments & Plan (with Medical Decision Making)  Vaginal bleeding 47-year-old female, vital signs normal, no indication for further evaluation or work-up at this time, recommend ibuprofen acetaminophen for discomfort.  Follow-up GYN, return criteria reviewed     I have reviewed the nursing notes.    I have reviewed the findings, diagnosis, plan and need for follow up with the patient.       Discharge Medication List as of 12/4/2022  7:31 PM          Final diagnoses:   Vaginal bleeding       12/4/2022   [facility] EMERGENCY DEPT     David Henderson MD  12/12/22 1013

## 2023-01-16 NOTE — TELEPHONE ENCOUNTER
[facility] Emergency Department[facility] Lab result notification     Patient/parent Name  Patient - Debbie    RN Assessment (Patient s current Symptoms), include time called.  2:50 PM return call from patient - she was seen in ED 12/4/22 - it appears she had bacterial growth on a final untreated urine cultured and the ED results team was unable to get ahold of patient to discuss symptoms/treatment/recommendations.  Today she is requesting lab results from messages left - she reports she is having some urinary symptoms  - she also has some abnormal thyroid labs    Lab result (if applicable):  Final urine culture on 12/7/22 shows the presence of bacteria(s): >100,000 CFU/ML Escherichia coli  [facility] Emergency Dept discharge antibiotic: None  Recommendations in treatment per [facility] ED lab result Urine Culture protocol.    Component      Latest Ref Rng & Units 12/4/2022   TSH      0.30 - 4.20 uIU/mL 66.46 (H)   T4 Free      0.90 - 1.70 ng/dL 0.41 (L)       RN Recommendations/Instructions per [place] ED lab result protocol  Patient notified of lab result and treatment recommendations. Discussed with patient we would be unable to treat given it is almost 6 weeks since visit/lab results - she would be advised to return to ED/UC/PCP clinic for symptoms or treatment - discuss thyroid levels with PCP - offered triage/scheduling - patient declined and said she would contact her [facility] clinic - no additional questions at this time.     Please Contact your PCP clinic or return to the Emergency department if your:    Symptoms return.    Symptoms worsen or other concerning symptom's.    PCP follow-up Questions asked: YES       Laura Brewer RN  [facility]  Emergency Dept Lab Result RN  Ph# [phone]     Copy of Lab result   Urine Culture  Order: [id number]   Collected 12/4/2022  6:06 PM      Status: Final result      Visible to patient: No  (inaccessible in MyChart)     Specimen Information: Urine, Clean Catch    5 Result Notes  Culture >100,000 CFU/mL Escherichia coli Abnormal             Resulting Agency: [facility]     Susceptibility     Escherichia coli     GLORIA     Ampicillin <=2 ug/mL Susceptible     Ampicillin/ Sulbactam <=2 ug/mL Susceptible     Cefazolin <=4 ug/mL Susceptible 1     Cefepime <=1 ug/mL Susceptible     Cefoxitin <=4 ug/mL Susceptible     Ceftazidime <=1 ug/mL Susceptible     Ceftriaxone <=1 ug/mL Susceptible     Ciprofloxacin <=0.25 ug/mL Susceptible     Gentamicin <=1 ug/mL Susceptible     Levofloxacin <=0.12 ug/mL Susceptible     Nitrofurantoin <=16 ug/mL Susceptible     Piperacillin/Tazobactam  Susceptible     Tobramycin <=1 ug/mL Susceptible     Trimethoprim/Sulfamethoxazole <=1/19 ug/mL Susceptible              1 Cefazolin GLORIA breakpoints are for the treatment of uncomplicated urinary tract infections. For the treatment of systemic infections, please contact the laboratory for additional testing.            Specimen Collected: 12/04/22  6:06 PM Last Resulted: 12/07/22  7:14 AM

## 2024-05-16 ENCOUNTER — HOSPITAL ENCOUNTER (EMERGENCY)
Facility: CLINIC | Age: 49
Discharge: HOME | End: 2024-05-16
Attending: FAMILY MEDICINE | Admitting: FAMILY MEDICINE
Payer: MEDICAID

## 2024-05-16 ENCOUNTER — APPOINTMENT (OUTPATIENT)
Dept: CT IMAGING | Facility: CLINIC | Age: 49
End: 2024-05-16
Attending: FAMILY MEDICINE
Payer: MEDICAID

## 2024-05-16 VITALS
HEIGHT: 65 IN | DIASTOLIC BLOOD PRESSURE: 68 MMHG | SYSTOLIC BLOOD PRESSURE: 118 MMHG | BODY MASS INDEX: 21.66 KG/M2 | WEIGHT: 130 LBS | OXYGEN SATURATION: 100 % | HEART RATE: 65 BPM | TEMPERATURE: 98.8 F | RESPIRATION RATE: 18 BRPM

## 2024-05-16 DIAGNOSIS — R55 VASOVAGAL SYNCOPE: ICD-10-CM

## 2024-05-16 DIAGNOSIS — R07.9 CHEST PAIN, UNSPECIFIED TYPE: ICD-10-CM

## 2024-05-16 LAB
ALBUMIN SERPL BCG-MCNC: 4.5 G/DL (ref 3.5–5.2)
ALP SERPL-CCNC: 71 U/L (ref 40–150)
ALT SERPL W P-5'-P-CCNC: 17 U/L (ref 0–50)
ANION GAP SERPL CALCULATED.3IONS-SCNC: 11 MMOL/L (ref 7–15)
AST SERPL W P-5'-P-CCNC: 29 U/L (ref 0–45)
BASOPHILS # BLD AUTO: 0.1 10E3/UL (ref 0–0.2)
BASOPHILS NFR BLD AUTO: 1 %
BILIRUB SERPL-MCNC: 0.2 MG/DL
BUN SERPL-MCNC: 12.1 MG/DL (ref 6–20)
CALCIUM SERPL-MCNC: 9.6 MG/DL (ref 8.6–10)
CHLORIDE SERPL-SCNC: 99 MMOL/L (ref 98–107)
CREAT SERPL-MCNC: 1.37 MG/DL (ref 0.51–0.95)
D DIMER PPP FEU-MCNC: 1.07 UG/ML FEU (ref 0–0.5)
DEPRECATED HCO3 PLAS-SCNC: 28 MMOL/L (ref 22–29)
EGFRCR SERPLBLD CKD-EPI 2021: 47 ML/MIN/1.73M2
EOSINOPHIL # BLD AUTO: 0.3 10E3/UL (ref 0–0.7)
EOSINOPHIL NFR BLD AUTO: 3 %
ERYTHROCYTE [DISTWIDTH] IN BLOOD BY AUTOMATED COUNT: 13.5 % (ref 10–15)
GLUCOSE SERPL-MCNC: 100 MG/DL (ref 70–99)
HCT VFR BLD AUTO: 36.3 % (ref 35–47)
HGB BLD-MCNC: 12 G/DL (ref 11.7–15.7)
HOLD SPECIMEN: NORMAL
HOLD SPECIMEN: NORMAL
IMM GRANULOCYTES # BLD: 0 10E3/UL
IMM GRANULOCYTES NFR BLD: 0 %
LYMPHOCYTES # BLD AUTO: 2.9 10E3/UL (ref 0.8–5.3)
LYMPHOCYTES NFR BLD AUTO: 27 %
MCH RBC QN AUTO: 33.5 PG (ref 26.5–33)
MCHC RBC AUTO-ENTMCNC: 33.1 G/DL (ref 31.5–36.5)
MCV RBC AUTO: 101 FL (ref 78–100)
MONOCYTES # BLD AUTO: 0.8 10E3/UL (ref 0–1.3)
MONOCYTES NFR BLD AUTO: 8 %
NEUTROPHILS # BLD AUTO: 6.4 10E3/UL (ref 1.6–8.3)
NEUTROPHILS NFR BLD AUTO: 61 %
NRBC # BLD AUTO: 0 10E3/UL
NRBC BLD AUTO-RTO: 0 /100
PLATELET # BLD AUTO: 299 10E3/UL (ref 150–450)
POTASSIUM SERPL-SCNC: 3.9 MMOL/L (ref 3.4–5.3)
PROT SERPL-MCNC: 7.4 G/DL (ref 6.4–8.3)
RBC # BLD AUTO: 3.58 10E6/UL (ref 3.8–5.2)
SODIUM SERPL-SCNC: 138 MMOL/L (ref 135–145)
T4 FREE SERPL-MCNC: <0.1 NG/DL (ref 0.9–1.7)
TROPONIN T SERPL HS-MCNC: 14 NG/L
TSH SERPL DL<=0.005 MIU/L-ACNC: 117.9 UIU/ML (ref 0.3–4.2)
WBC # BLD AUTO: 10.6 10E3/UL (ref 4–11)

## 2024-05-16 PROCEDURE — 85025 COMPLETE CBC W/AUTO DIFF WBC: CPT | Performed by: FAMILY MEDICINE

## 2024-05-16 PROCEDURE — 250N000013 HC RX MED GY IP 250 OP 250 PS 637: Performed by: FAMILY MEDICINE

## 2024-05-16 PROCEDURE — 250N000011 HC RX IP 250 OP 636: Performed by: FAMILY MEDICINE

## 2024-05-16 PROCEDURE — 93005 ELECTROCARDIOGRAM TRACING: CPT | Performed by: FAMILY MEDICINE

## 2024-05-16 PROCEDURE — 93010 ELECTROCARDIOGRAM REPORT: CPT | Performed by: FAMILY MEDICINE

## 2024-05-16 PROCEDURE — 84484 ASSAY OF TROPONIN QUANT: CPT | Performed by: FAMILY MEDICINE

## 2024-05-16 PROCEDURE — 250N000009 HC RX 250: Performed by: FAMILY MEDICINE

## 2024-05-16 PROCEDURE — 36415 COLL VENOUS BLD VENIPUNCTURE: CPT | Performed by: FAMILY MEDICINE

## 2024-05-16 PROCEDURE — 71275 CT ANGIOGRAPHY CHEST: CPT

## 2024-05-16 PROCEDURE — 85379 FIBRIN DEGRADATION QUANT: CPT | Performed by: FAMILY MEDICINE

## 2024-05-16 PROCEDURE — 84439 ASSAY OF FREE THYROXINE: CPT | Performed by: FAMILY MEDICINE

## 2024-05-16 PROCEDURE — 99284 EMERGENCY DEPT VISIT MOD MDM: CPT | Performed by: FAMILY MEDICINE

## 2024-05-16 PROCEDURE — 99285 EMERGENCY DEPT VISIT HI MDM: CPT | Mod: 25 | Performed by: FAMILY MEDICINE

## 2024-05-16 PROCEDURE — 82040 ASSAY OF SERUM ALBUMIN: CPT | Performed by: FAMILY MEDICINE

## 2024-05-16 PROCEDURE — 84443 ASSAY THYROID STIM HORMONE: CPT | Performed by: FAMILY MEDICINE

## 2024-05-16 RX ORDER — MAGNESIUM HYDROXIDE/ALUMINUM HYDROXICE/SIMETHICONE 120; 1200; 1200 MG/30ML; MG/30ML; MG/30ML
15 SUSPENSION ORAL ONCE
Status: COMPLETED | OUTPATIENT
Start: 2024-05-16 | End: 2024-05-16

## 2024-05-16 RX ORDER — IOPAMIDOL 755 MG/ML
66 INJECTION, SOLUTION INTRAVASCULAR ONCE
Status: COMPLETED | OUTPATIENT
Start: 2024-05-16 | End: 2024-05-16

## 2024-05-16 RX ADMIN — SODIUM CHLORIDE 94 ML: 9 INJECTION, SOLUTION INTRAVENOUS at 19:29

## 2024-05-16 RX ADMIN — ALUMINUM HYDROXIDE, MAGNESIUM HYDROXIDE, AND DIMETHICONE 15 ML: 200; 20; 200 SUSPENSION ORAL at 19:07

## 2024-05-16 RX ADMIN — IOPAMIDOL 66 ML: 755 INJECTION, SOLUTION INTRAVENOUS at 19:29

## 2024-05-16 ASSESSMENT — ACTIVITIES OF DAILY LIVING (ADL)
ADLS_ACUITY_SCORE: 35
ADLS_ACUITY_SCORE: 35

## 2024-05-16 ASSESSMENT — COLUMBIA-SUICIDE SEVERITY RATING SCALE - C-SSRS
6. HAVE YOU EVER DONE ANYTHING, STARTED TO DO ANYTHING, OR PREPARED TO DO ANYTHING TO END YOUR LIFE?: NO
2. HAVE YOU ACTUALLY HAD ANY THOUGHTS OF KILLING YOURSELF IN THE PAST MONTH?: NO
1. IN THE PAST MONTH, HAVE YOU WISHED YOU WERE DEAD OR WISHED YOU COULD GO TO SLEEP AND NOT WAKE UP?: NO

## 2024-05-16 NOTE — ED TRIAGE NOTES
Per patient had low back pain last ight and prior to arrival developed chest pressure in sternal/epigastric area.  Patient verbalized she bent over to put on pants after showering and woke up on the group home floor     Triage Assessment (Adult)       Row Name 05/16/24 1823          Triage Assessment    Airway WDL WDL        Respiratory WDL    Respiratory WDL WDL        Skin Circulation/Temperature WDL    Skin Circulation/Temperature WDL WDL        Cardiac WDL    Cardiac WDL X;chest pain        Chest Pain Assessment    Chest Pain Location epigastric;midsternal     Character squeezing     Precipitating Factors activity     Alleviating Factors nothing     Chest Pain Intervention cardiac biomarkers drawn;cardiac monitor placed;12-lead ECG obtained        Peripheral/Neurovascular WDL    Peripheral Neurovascular WDL WDL        Cognitive/Neuro/Behavioral WDL    Cognitive/Neuro/Behavioral WDL WDL

## 2024-05-16 NOTE — ED PROVIDER NOTES
"  HPI   Patient is a 48-year-old female presenting with chest pain.  Per my chart review, the patient has a known history of mental health related problems and chronic back pain.  She also has hypothyroidism but has not been taking her medication as directed.  She smokes tobacco.  She does not use an inhaler.    The patient has been in the [facility] group home for 8 days.  She had brief chest pain a few days ago but it resolved quickly without intervention.  Then, last evening she had onset of chest pain again.  Her chest pain started in the midline and has been constant since onset.  Her pain is moderate to severe.  She feels like it is hard to take a deep breath and this causes more pain.  She feels short of breath because of this.  No cough or congestion that is new or different.  No fever.  No leg pain or swelling that is new or different.  She does describe bilateral thigh pain \"that is worse when to go upstairs.\"  No swollen or hot joints.  No intraoral lesions.  No eye symptoms.  She denies obvious reflux or heartburn.  She has been using NSAIDs recently.  The patient was sitting on the edge of her bed at about 5:00 PM today when she suddenly became nauseous and lightheaded and she woke up on the ground.  No palpitations reported.  This is not happened previously.      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     Priority: Medium      Past Medical History:    Past Medical History:   Diagnosis Date    Cervical ca (H)     Knee pain      Past Surgical History:    Past Surgical History:   Procedure Laterality Date    ARTHROSCOPY KNEE Right     D & C  6/2001    SURGICAL HISTORY OF -       leep     Family History:    Family History   Problem Relation Age of Onset    Diabetes Mother     Hypertension Mother     Diabetes Father     Hypertension Father     Heart Disease Father       " "  heart disease     Social History:  Marital Status:  Single [1]  Social History     Tobacco Use    Smoking status: Every Day     Current packs/day: 1.00     Average packs/day: 1 pack/day for 10.0 years (10.0 ttl pk-yrs)     Types: Cigarettes    Smokeless tobacco: Never   Substance Use Topics    Alcohol use: Yes     Comment: rarely    Drug use: No      Medications:    omeprazole (PRILOSEC) 20 MG DR capsule  ADVIL 200 MG OR CAPS  ALPRAZolam (XANAX PO)  HYDROcodone-acetaminophen (NORCO) 5-325 MG per tablet  NAPROXEN PO  oxyCODONE-acetaminophen (PERCOCET) 5-325 MG per tablet  oxyCODONE-acetaminophen (PERCOCET) 5-325 MG per tablet      Review of Systems   All other systems reviewed and are negative.      PE   BP: (!) 126/101  Pulse: 81  Temp: 98.8  F (37.1  C)  Resp: 18  Height: 165.1 cm (5' 5\")  Weight: 59 kg (130 lb)  SpO2: 98 %  Physical Exam  Vitals reviewed.   Constitutional:       Appearance: She is well-developed.   HENT:      Head: Normocephalic and atraumatic.      Right Ear: External ear normal.      Left Ear: External ear normal.      Nose: Nose normal.      Mouth/Throat:      Mouth: Mucous membranes are moist.      Pharynx: Oropharynx is clear.   Eyes:      Extraocular Movements: Extraocular movements intact.      Conjunctiva/sclera: Conjunctivae normal.      Pupils: Pupils are equal, round, and reactive to light.   Cardiovascular:      Rate and Rhythm: Normal rate and regular rhythm.      Heart sounds: Normal heart sounds.   Pulmonary:      Effort: Pulmonary effort is normal.      Breath sounds: Normal breath sounds.   Chest:      Chest wall: No tenderness.   Abdominal:      Palpations: Abdomen is soft.      Tenderness: There is no abdominal tenderness.   Musculoskeletal:         General: Normal range of motion.      Cervical back: Normal range of motion.      Right lower leg: No tenderness. No edema.      Left lower leg: No tenderness. No edema.   Skin:     General: Skin is warm and dry.   Neurological:    "   Mental Status: She is alert and oriented to person, place, and time.   Psychiatric:         Behavior: Behavior normal.         ED COURSE and MDM   1851.  Patient with chest pain and syncopal episode.  The syncopal episode sounds like a vasovagal source.  She was nauseous and lightheaded before going down.  She was uncomfortable with her chest pain which may be an intestinal/esophageal/stomach problem.  Her EKG is unremarkable and documented below.  Lab values pending.  D-dimer added.  GI cocktail ordered.    2019.  Patient has no EKG changes.  Her CT scan does not show PE.  Her CT scan does show trace pleural effusions and scant pericardial effusion.  She does not have recent symptoms suggesting viral illness.  No ongoing fever.  Blood pressure within normal limits or slightly elevated.  Pulse within normal limits.  I am not worried about tamponade.  Her pain is not positional.  Vasovagal syncope likely.  Source of chest pain perhaps reflux/esophagitis.  Consider Prilosec, prescription provided.    EKG  (1833)   Interpretation performed by me.  Rate: 60     Rhythm: ns     Axis: nl  Intervals: LA (12-2) 166, QRS (<12) 94, QTc (>5) 396  P wave: nl     QRS complex: nl   ST segment / T-wave: nl  Conclusion: nl    Electronic medical chart reviewed, including medical problems, medications, medical allergies, social history.  Recent hospitalizations and surgical procedures reviewed.  Recent clinic visits and consultations reviewed.  Recent labs and test results reviewed.  Nursing notes reviewed.    The patient, their parent if applicable, and/or their medical decision maker(s) and I have reviewed all of the available historical information, applicable PMH, physical exam findings, and objective diagnostic data gathered during this ED visit.  We then discussed all work-up options and then together agreed upon the course taken during this visit.  The ultimate disposition and plan was a cooperative decision made between  myself and the patient, their parent if applicable, and/or their legal decision maker(s).  The risks and benefits of all decisions made during this visit were discussed to the best of my abilities given the circumstances, and all parties are understanding of the pertinent ramifications of these decisions.      LABS  Labs Ordered and Resulted from Time of ED Arrival to Time of ED Departure   COMPREHENSIVE METABOLIC PANEL - Abnormal       Result Value    Sodium 138      Potassium 3.9      Carbon Dioxide (CO2) 28      Anion Gap 11      Urea Nitrogen 12.1      Creatinine 1.37 (*)     GFR Estimate 47 (*)     Calcium 9.6      Chloride 99      Glucose 100 (*)     Alkaline Phosphatase 71      AST 29      ALT 17      Protein Total 7.4      Albumin 4.5      Bilirubin Total 0.2     CBC WITH PLATELETS AND DIFFERENTIAL - Abnormal    WBC Count 10.6      RBC Count 3.58 (*)     Hemoglobin 12.0      Hematocrit 36.3       (*)     MCH 33.5 (*)     MCHC 33.1      RDW 13.5      Platelet Count 299      % Neutrophils 61      % Lymphocytes 27      % Monocytes 8      % Eosinophils 3      % Basophils 1      % Immature Granulocytes 0      NRBCs per 100 WBC 0      Absolute Neutrophils 6.4      Absolute Lymphocytes 2.9      Absolute Monocytes 0.8      Absolute Eosinophils 0.3      Absolute Basophils 0.1      Absolute Immature Granulocytes 0.0      Absolute NRBCs 0.0     D DIMER QUANTITATIVE - Abnormal    D-Dimer Quantitative 1.07 (*)    TROPONIN T, HIGH SENSITIVITY - Normal    Troponin T, High Sensitivity 14     TSH WITH FREE T4 REFLEX       IMAGING  Images reviewed by me.  Radiology report also reviewed.  CT Chest Pulmonary Embolism w Contrast   Final Result   IMPRESSION:   1.  Negative for pulmonary embolism.   2.  Tiny bilateral pleural effusions and tiny anterior pericardial effusion.             Procedures    Medications   alum & mag hydroxide-simethicone (MAALOX) suspension 15 mL (15 mLs Oral $Given 5/16/24 5861)   iopamidol  (ISOVUE-370) solution 66 mL (66 mLs Intravenous $Given 5/16/24 1929)   sodium chloride 0.9 % bag 500mL for CT scan flush use (94 mLs As instructed $Given 5/16/24 1929)         IMPRESSION       ICD-10-CM    1. Vasovagal syncope  R55 Primary Care Referral      2. Chest pain, unspecified type  R07.9 Primary Care Referral               Medication List        Started      omeprazole 20 MG DR capsule  Commonly known as: PriLOSEC  20 mg, Oral, DAILY                                  Niall Conrad MD  05/16/24 2033

## 2024-05-17 ENCOUNTER — TELEPHONE (OUTPATIENT)
Dept: NURSING | Facility: CLINIC | Age: 49
End: 2024-05-17
Payer: MEDICAID

## 2024-05-17 NOTE — DISCHARGE INSTRUCTIONS
RETURN TO THE EMERGENCY ROOM FOR THE FOLLOWING:    Severely worsened chest pain, new trouble with breathing, recurrent episodes of fainting, or at anytime for any concern.    FOLLOW UP:    Primary care follow-up recommended, ideally 1 to 2 weeks.    TREATMENT RECOMMENDATIONS:    There have been no new medications provided and there are no prescription medication changes recommended.     NURSE ADVICE LINE:  [phone] or [phone]

## 2024-05-17 NOTE — TELEPHONE ENCOUNTER
[facility]    Reason for call: Lab Result Notification     Lab Result (including Rx patient on, if applicable).  If culture, copy of lab report at bottom.  Lab Result:   Component      Latest Ref Rng 5/16/2024  6:29 PM   T4 Free      0.90 - 1.70 ng/dL <0.10 (L)       Legend:  (L) Low  Component      Latest Ref Rng 5/16/2024  6:29 PM   TSH      0.30 - 4.20 uIU/mL 117.90 (H)       Legend:  (H) High    Creatinine Level (mg/dl)   Creatinine   Date Value Ref Range Status   05/16/2024 1.37 (H) 0.51 - 0.95 mg/dL Final   06/20/2013 0.86 0.52 - 1.04 mg/dL Final    Creatinine clearance (ml/min), if applicable    Serum creatinine: 1.37 mg/dL (H) 05/16/24 1829  Estimated creatinine clearance: 46.3 mL/min (A)     Patient's current Symptoms:   Phone not in service, unable to leave a message. Letter sent.     RN Recommendations/Instructions per [facility] ED lab result protocol:   [facility] ED lab result protocol utilized: Radha EVANS RN

## 2024-05-17 NOTE — LETTER
May 17, 2024        Debbie Hernandez  [address]   [facility] [address]          Dear Debbie Hernandez:    You were seen in the [facility] Emergency Department at [facility] on 5/16/2024.  We are unable to reach you by phone, so we are sending you this letter.     It is important that you call [facility] Emergency Department lab result nurse at [phone], as we have information to relay to you AND/OR we MAY have to make some changes in your treatment.    Best time to call back is between 9AM and 5:30PM, 7 days a week.      Sincerely,     [facility] Emergency Department Lab Result RN  [phone]

## 2024-11-07 ENCOUNTER — VIRTUAL VISIT (OUTPATIENT)
Dept: FAMILY MEDICINE | Facility: CLINIC | Age: 49
End: 2024-11-07
Payer: MEDICAID

## 2024-11-07 DIAGNOSIS — Z59.82 TRANSPORTATION UNAVAILABLE: ICD-10-CM

## 2024-11-07 DIAGNOSIS — E03.9 HYPOTHYROIDISM, UNSPECIFIED TYPE: ICD-10-CM

## 2024-11-07 DIAGNOSIS — F33.1 MODERATE EPISODE OF RECURRENT MAJOR DEPRESSIVE DISORDER (H): Primary | ICD-10-CM

## 2024-11-07 PROBLEM — F41.1 GAD (GENERALIZED ANXIETY DISORDER): Status: ACTIVE | Noted: 2022-02-08

## 2024-11-07 PROBLEM — M54.9 SEVERE BACK PAIN: Status: ACTIVE | Noted: 2022-02-08

## 2024-11-07 PROBLEM — G89.29 CHRONIC PAIN OF RIGHT KNEE: Status: ACTIVE | Noted: 2022-02-08

## 2024-11-07 PROBLEM — G47.00 INSOMNIA: Status: ACTIVE | Noted: 2022-02-08

## 2024-11-07 PROBLEM — M25.561 CHRONIC PAIN OF RIGHT KNEE: Status: ACTIVE | Noted: 2022-02-08

## 2024-11-07 PROCEDURE — 99442 PR PHYSICIAN TELEPHONE EVALUATION 11-20 MIN: CPT | Mod: 93 | Performed by: NURSE PRACTITIONER

## 2024-11-07 RX ORDER — LEVOTHYROXINE SODIUM 112 UG/1
112 TABLET ORAL DAILY
Qty: 60 TABLET | Refills: 0 | Status: SHIPPED | OUTPATIENT
Start: 2024-11-07

## 2024-11-07 RX ORDER — HYDROCODONE BITARTRATE AND ACETAMINOPHEN 5; 325 MG/1; MG/1
1-2 TABLET ORAL EVERY 6 HOURS PRN
Qty: 12 TABLET | Refills: 0 | Status: CANCELLED | OUTPATIENT
Start: 2024-11-07

## 2024-11-07 RX ORDER — ALPRAZOLAM 0.25 MG/1
0.25 TABLET ORAL 3 TIMES DAILY PRN
Qty: 30 TABLET | Refills: 0 | Status: CANCELLED | OUTPATIENT
Start: 2024-11-07

## 2024-11-07 RX ORDER — LEVOTHYROXINE SODIUM 112 UG/1
112 TABLET ORAL DAILY
COMMUNITY
End: 2024-11-07

## 2024-11-07 SDOH — ECONOMIC STABILITY - TRANSPORTATION SECURITY: TRANSPORTATION INSECURITY: Z59.82

## 2024-11-07 ASSESSMENT — ANXIETY QUESTIONNAIRES
5. BEING SO RESTLESS THAT IT IS HARD TO SIT STILL: NEARLY EVERY DAY
GAD7 TOTAL SCORE: 18
7. FEELING AFRAID AS IF SOMETHING AWFUL MIGHT HAPPEN: MORE THAN HALF THE DAYS
1. FEELING NERVOUS, ANXIOUS, OR ON EDGE: NEARLY EVERY DAY
2. NOT BEING ABLE TO STOP OR CONTROL WORRYING: MORE THAN HALF THE DAYS
IF YOU CHECKED OFF ANY PROBLEMS ON THIS QUESTIONNAIRE, HOW DIFFICULT HAVE THESE PROBLEMS MADE IT FOR YOU TO DO YOUR WORK, TAKE CARE OF THINGS AT HOME, OR GET ALONG WITH OTHER PEOPLE: EXTREMELY DIFFICULT
GAD7 TOTAL SCORE: 18
3. WORRYING TOO MUCH ABOUT DIFFERENT THINGS: NEARLY EVERY DAY
6. BECOMING EASILY ANNOYED OR IRRITABLE: MORE THAN HALF THE DAYS

## 2024-11-07 ASSESSMENT — PATIENT HEALTH QUESTIONNAIRE - PHQ9
5. POOR APPETITE OR OVEREATING: NEARLY EVERY DAY
SUM OF ALL RESPONSES TO PHQ QUESTIONS 1-9: 15

## 2024-11-07 NOTE — PROGRESS NOTES
Debbie is a 49 year old who is being evaluated via a billable telephone visit.    What phone number would you like to be contacted at? [phone]  How would you like to obtain your AVS? Mail a copy  Originating Location (pt. Location): Home      Distant Location (provider location):  On-site    Assessment & Plan     Moderate episode of recurrent major depressive disorder (H)  Recommend starting sertraline to help with anxiety and depression. Plan in person follow up in 1 month.   - sertraline (ZOLOFT) 50 MG tablet; Take 1 tablet (50 mg) by mouth daily.    Hypothyroidism, unspecified type  Restart thyroid medication. Last check . Refill provided. Will plan checking TSH at follow up visit.   - levothyroxine (SYNTHROID/LEVOTHROID) 112 MCG tablet; Take 1 tablet (112 mcg) by mouth daily.    Transportation unavailable  - Primary Care - Care Coordination Referral; Future          Nicotine/Tobacco Cessation  She reports that she has been smoking cigarettes. She has a 10 pack-year smoking history. She has never used smokeless tobacco.  Nicotine/Tobacco Cessation Plan  Not discussed today      Subjective   Debbie is a 49 year old, presenting for the following health issues:  Thyroid Problem, Depression, and Anxiety        11/7/2024    10:58 AM   Additional Questions   Roomed by Carmencita TORRES         Depression and Anxiety   How are you doing with your depression since your last visit? Worsened   How are you doing with your anxiety since your last visit?  Worsened   Are you having other symptoms that might be associated with depression or anxiety? Yes:  anxiety attacks with SOB and chest pain  Have you had a significant life event? Transportation Concerns, Grief or Loss, and Health Concerns   Do you have any concerns with your use of alcohol or other drugs? No  She has taken alprazolam for anxiety as needed for panic attacks.   The last 5 years have been hard on her mental health. 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      Social History     Tobacco Use    Smoking status: Every Day     Current packs/day: 1.00     Average packs/day: 1 pack/day for 10.0 years (10.0 ttl pk-yrs)     Types: Cigarettes    Smokeless tobacco: Never   Vaping Use    Vaping status: Never Used   Substance Use Topics    Alcohol use: Yes     Comment: rarely    Drug use: No         11/7/2024    11:03 AM   PHQ   PHQ-9 Total Score 15   Q9: Thoughts of better off dead/self-harm past 2 weeks Not at all         11/7/2024    11:03 AM   CONCETTA-7 SCORE   Total Score 18         11/7/2024    11:03 AM   Last PHQ-9   1.  Little interest or pleasure in doing things 2   2.  Feeling down, depressed, or hopeless 2   3.  Trouble falling or staying asleep, or sleeping too much 3   4.  Feeling tired or having little energy 1   5.  Poor appetite or overeating 1   6.  Feeling bad about yourself 1   7.  Trouble concentrating 2   8.  Moving slowly or restless 3   Q9: Thoughts of better off dead/self-harm past 2 weeks 0   PHQ-9 Total Score 15   Difficulty at work, home, or with people Very difficult         11/7/2024    11:03 AM   CONCETTA-7    1. Feeling nervous, anxious, or on edge 3   2. Not being able to stop or control worrying 2   3. Worrying too much about different things 3   4. Trouble relaxing 3   5. Being so restless that it is hard to sit still 3   6. Becoming easily annoyed or irritable 2   7. Feeling afraid, as if something awful might happen 2   CONCETTA-7 Total Score 18   If you checked any problems, how difficult have they made it for you to do your work, take care of things at home, or get along with other people? Extremely difficult       Suicide Assessment Five-step Evaluation and Treatment (SAFE-T)      Hypothyroidism Follow-up    Since last visit, patient describes the following symptoms: weight gain, weight loss, dry skin, constipation, tremors, anxiety, and depression    She desires refills of medications. She was unable to get to appointments due to difficulties with  transportation.   She has not been on her thyroid medication for several years.     Chronic/Recurring Back Pain Follow Up    Where is your back pain located? (Select all that apply) low back bilateral, gluteus right, and hip right  How would you describe your back pain?  dull ache and sharp  Where does your back pain spread? the right buttock  Since your last clinic visit for back pain, how has your pain changed? gradually worsening  Does your back pain interfere with your job? YES  Since your last visit, have you tried any new treatment? No  She mentions arthritis in her back and has pain in her back that is chronic.   She has not tried gabapentin for her pain in the past.         11/7/2024    11:03 AM   PHQ-9 SCORE   PHQ-9 Total Score 15           11/7/2024    11:03 AM   CONCETTA-7 SCORE   Total Score 18           11/7/2024    11:03 AM   PEG Score   PEG Total Score 9.67        How many servings of fruits and vegetables do you eat daily?  2-3  On average, how many sweetened beverages do you drink each day (Examples: soda, juice, sweet tea, etc.  Do NOT count diet or artificially sweetened beverages)?   4  How many days per week do you exercise enough to make your heart beat faster? 3 or less  How many minutes a day do you exercise enough to make your heart beat faster? 9 or less  How many days per week do you miss taking your medication? 7  What makes it hard for you to take your medications?   Hasn't been able to get to the pharmacy to  medications; unable to drive.          Review of Systems  Constitutional, HEENT, cardiovascular, pulmonary, gi and gu systems are negative, except as otherwise noted.      Objective           Vitals:  No vitals were obtained today due to virtual visit.    Physical Exam   General: Alert and no distress //Respiratory: No audible wheeze, cough, or shortness of breath // Psychiatric:  Appropriate affect, tone, and pace of words            Phone call duration: 12 minutes  Signed  Electronically by: Sofya Grover, APRN CNP

## 2024-11-08 ENCOUNTER — PATIENT OUTREACH (OUTPATIENT)
Dept: CARE COORDINATION | Facility: CLINIC | Age: 49
End: 2024-11-08
Payer: MEDICAID

## 2024-11-08 NOTE — PROGRESS NOTES
Clinic Care Coordination Contact  [facility]/Voicemail    Clinical Data: Care Coordinator Outreach    Outreach Documentation Number of Outreach Attempt   11/8/2024  10:12 AM 1       Left message on patient's voicemail with call back information and requested return call.      Plan: Care Coordinator will try to reach patient again in 1-2 business days.    KYLEIGH Tabares  [phone]  [facility]

## 2024-11-08 NOTE — LETTER
[facility] CARE COORDINATION  [address]  [place] [address]    November 11, 2024    Debbie Hernandez  [address]  [place] [address]      Dear Debbie,    I am a clinic community health worker who works with ONUR March CNP with the [facility]. I have been trying to reach you recently to introduce Clinic Care Coordination. Below is a description of clinic care coordination and how I can further assist you.       The clinic care coordination team is made up of a registered nurse, , financial resource worker and community health worker who understand the health care system. The goal of clinic care coordination is to help you manage your health and improve access to the health care system. Our team works alongside your provider to assist you in determining your health and social needs. We can help you obtain health care and community resources, providing you with necessary information and education. We can work with you through any barriers and develop a care plan that helps coordinate and strengthen the communication between you and your care team.  Our services are voluntary and are offered without charge to you personally.    I am including transportation resources for you in this letter:    Your insurance plan includes free medical rides to appointments. You can call this number [phone] to inquire about medical rides to any appointments you have/will have.     [employer]: [phone], option 13.   [employer] is a low cost bus line in [facility]. You can call to schedule a ride with them. You can use them for both medical transportation and daily transportation.     Please feel free to contact me with any questions or concerns regarding care coordination and what we can offer.      We are focused on providing you with the highest-quality healthcare experience possible.    Sincerely,     Melissa Arredondo  [facility]  [phone]  [facility]

## 2024-11-11 NOTE — PROGRESS NOTES
Clinic Care Coordination Contact  [facility]/Voicemail    Clinical Data: Care Coordinator Outreach    Outreach Documentation Number of Outreach Attempt   11/8/2024  10:12 AM 1   11/11/2024  10:04 AM 2       Left message on patient's voicemail with call back information and requested return call.      Plan: Care Coordinator will send care coordination introduction letter with care coordinator contact information and explanation of care coordination services via mail. Care Coordinator will do no further outreaches at this time.    CHW included transportation resources in Intro Letter.     KYLEIGH Tabares  [phone]  [facility]

## 2025-03-27 ENCOUNTER — PATIENT OUTREACH (OUTPATIENT)
Dept: FAMILY MEDICINE | Facility: CLINIC | Age: 50
End: 2025-03-27
Payer: MEDICAID

## 2025-03-27 NOTE — TELEPHONE ENCOUNTER
Patient Quality Outreach    Patient is due for the following:   Breast Cancer Screening - Mammogram    Action(s) Taken:   No follow up needed at this time.    Type of outreach:    Sent letter.    Questions for provider review:    None         Karen Moran MA  Chart routed to None.

## 2025-03-27 NOTE — LETTER
March 27, 2025    Debbie Hernandez  [address]  [place] [place] [address]        Your team at [facility] cares about your health. We have reviewed your chart and based on our findings; we are making the following recommendations to better manage your health.     You are in particular need of attention regarding the following:     Schedule Annual MAMMOGRAPHY. [facility] scheduling number is [phone] or schedule in [email or web address] (self referral).  1 in 8 women will develop invasive breast cancer during her lifetime and it is the most common non-skin cancer in [place] Women. EARLY detection, new treatments, and a better understanding of the disease have increased survival rates- the 5 year survival rate in the 1960's was 63% and today it is close to 90%.  If you are under/uninsured, we recommend you contact the [facility]. They offer mammograms at no charge or on a sliding fee charge. You can schedule with them at [phone]. Please have them send us the results.     If you have already completed these items, please contact the clinic via phone or   [email or web address] so your care team can review and update your records. Thank you for   choosing [facility] for your healthcare needs. For any questions,   concerns, or to schedule an appointment please contact our clinic.    Healthy Regards,      Your [facility] Care Team            Electronically signed